# Patient Record
Sex: FEMALE | NOT HISPANIC OR LATINO | Employment: FULL TIME | URBAN - METROPOLITAN AREA
[De-identification: names, ages, dates, MRNs, and addresses within clinical notes are randomized per-mention and may not be internally consistent; named-entity substitution may affect disease eponyms.]

---

## 2021-11-30 ENCOUNTER — OFFICE VISIT (OUTPATIENT)
Dept: FAMILY MEDICINE CLINIC | Facility: CLINIC | Age: 26
End: 2021-11-30

## 2021-11-30 VITALS
WEIGHT: 107.4 LBS | HEART RATE: 66 BPM | TEMPERATURE: 98.2 F | BODY MASS INDEX: 19.77 KG/M2 | OXYGEN SATURATION: 100 % | HEIGHT: 62 IN | SYSTOLIC BLOOD PRESSURE: 102 MMHG | DIASTOLIC BLOOD PRESSURE: 68 MMHG

## 2021-11-30 DIAGNOSIS — J30.2 SEASONAL ALLERGIC RHINITIS, UNSPECIFIED TRIGGER: ICD-10-CM

## 2021-11-30 DIAGNOSIS — Z91.038 ALLERGY TO INSECT BITES: Primary | ICD-10-CM

## 2021-11-30 PROCEDURE — 99203 OFFICE O/P NEW LOW 30 MIN: CPT | Performed by: NURSE PRACTITIONER

## 2021-11-30 RX ORDER — CETIRIZINE HYDROCHLORIDE 10 MG/1
10 TABLET ORAL DAILY
Qty: 30 TABLET | Refills: 5 | Status: SHIPPED | OUTPATIENT
Start: 2021-11-30 | End: 2022-05-19

## 2021-11-30 NOTE — PROGRESS NOTES
"Chief Complaint  Establish Care and sinus trouble    Subjective          Nolvia Mckenna presents to [facility]  History of Present Illness  She presents today as a new patient to establish care.  She is accompanied by her mother.    She states that whenever she gets bitten by an insect like a mosquito or a fire ant that she has an allergic reaction where she swells.  She also complains of swelling with spider bites.  She denies having any breathing difficulty even when she has been stung by bees.  She also complains of sinus trouble seasonally.  She has not tried any over-the-counter meds for her sinus issues and only uses steam therapy.    She has never had a Pap smear but also states she has never been sexually active.  We discussed purpose of Pap smears and that she can start getting Pap smears after she becomes sexually active.    Objective   Vital Signs:   /68   Pulse 66   Temp 98.2 °F (36.8 °C)   Ht 157.5 cm (62\")   Wt 48.7 kg (107 lb 6.4 oz)   SpO2 100%   BMI 19.64 kg/m²     Physical Exam  Vitals reviewed.   Constitutional:       Appearance: Normal appearance. She is well-developed.   HENT:      Right Ear: Hearing, tympanic membrane, ear canal and external ear normal.      Left Ear: Hearing, tympanic membrane, ear canal and external ear normal.      Mouth/Throat:      Pharynx: No pharyngeal swelling, oropharyngeal exudate or posterior oropharyngeal erythema.      Comments: Postnasal drainage noted.  Neck:      Thyroid: No thyroid mass, thyromegaly or thyroid tenderness.   Cardiovascular:      Rate and Rhythm: Normal rate and regular rhythm.      Heart sounds: No murmur heard.  No friction rub. No gallop.    Pulmonary:      Effort: Pulmonary effort is normal.      Breath sounds: Normal breath sounds. No wheezing or rhonchi.   Lymphadenopathy:      Cervical: No cervical adenopathy.   Skin:     General: Skin is warm and dry.   Neurological:      Mental Status: She is " alert and oriented to person, place, and time.      Cranial Nerves: No cranial nerve deficit.   Psychiatric:         Mood and Affect: Mood and affect normal.         Behavior: Behavior normal.         Thought Content: Thought content normal. Thought content does not include homicidal or suicidal ideation.         Judgment: Judgment normal.        Result Review :                 Assessment and Plan    Diagnoses and all orders for this visit:    1. Allergy to insect bites (Primary)  Assessment & Plan:  I advised her to wear insect repellent when she is going to be outdoors.  I also advised to use sticky spider traps in her bedroom and not to leave her clothes on the floor.  We also discussed that if she takes Zyrtec during the summertime that it will decrease the allergic response to insect bites.      2. Seasonal allergic rhinitis, unspecified trigger  Assessment & Plan:  Recommend to take Zyrtec daily during seasonal allergies.      Other orders  -     cetirizine (zyrTEC) 10 MG tablet; Take 1 tablet by mouth Daily.  Dispense: 30 tablet; Refill: 5      Follow Up   Return if symptoms worsen or fail to improve.  Patient was given instructions and counseling regarding her condition or for health maintenance advice. Please see specific information pulled into the AVS if appropriate.

## 2021-11-30 NOTE — ASSESSMENT & PLAN NOTE
I advised her to wear insect repellent when she is going to be outdoors.  I also advised to use sticky spider traps in her bedroom and not to leave her clothes on the floor.  We also discussed that if she takes Zyrtec during the summertime that it will decrease the allergic response to insect bites.

## 2022-04-21 ENCOUNTER — TELEPHONE (OUTPATIENT)
Dept: FAMILY MEDICINE CLINIC | Facility: CLINIC | Age: 27
End: 2022-04-21

## 2022-05-19 ENCOUNTER — OFFICE VISIT (OUTPATIENT)
Dept: FAMILY MEDICINE CLINIC | Facility: CLINIC | Age: 27
End: 2022-05-19

## 2022-05-19 VITALS
BODY MASS INDEX: 19.69 KG/M2 | HEART RATE: 58 BPM | SYSTOLIC BLOOD PRESSURE: 112 MMHG | WEIGHT: 107 LBS | DIASTOLIC BLOOD PRESSURE: 62 MMHG | TEMPERATURE: 97.5 F | HEIGHT: 62 IN | OXYGEN SATURATION: 98 %

## 2022-05-19 DIAGNOSIS — R53.83 FATIGUE, UNSPECIFIED TYPE: ICD-10-CM

## 2022-05-19 DIAGNOSIS — R44.8 FEELS COLD: ICD-10-CM

## 2022-05-19 DIAGNOSIS — Z00.00 ANNUAL PHYSICAL EXAM: Primary | ICD-10-CM

## 2022-05-19 LAB
25(OH)D3 SERPL-MCNC: 21.9 NG/ML (ref 30–100)
ALBUMIN SERPL-MCNC: 4.8 G/DL (ref 3.5–5.2)
ALBUMIN/GLOB SERPL: 1.6 G/DL
ALP SERPL-CCNC: 35 U/L (ref 39–117)
ALT SERPL W P-5'-P-CCNC: 17 U/L (ref 1–33)
ANION GAP SERPL CALCULATED.3IONS-SCNC: 13.3 MMOL/L (ref 5–15)
AST SERPL-CCNC: 19 U/L (ref 1–32)
BASOPHILS # BLD MANUAL: 0.05 10*3/MM3 (ref 0–0.2)
BASOPHILS NFR BLD MANUAL: 1 % (ref 0–1.5)
BILIRUB SERPL-MCNC: 1.2 MG/DL (ref 0–1.2)
BUN SERPL-MCNC: 9 MG/DL (ref 6–20)
BUN/CREAT SERPL: 13.4 (ref 7–25)
CALCIUM SPEC-SCNC: 9.5 MG/DL (ref 8.6–10.5)
CHLORIDE SERPL-SCNC: 105 MMOL/L (ref 98–107)
CO2 SERPL-SCNC: 19.7 MMOL/L (ref 22–29)
CREAT SERPL-MCNC: 0.67 MG/DL (ref 0.57–1)
DEPRECATED RDW RBC AUTO: 39 FL (ref 37–54)
EGFRCR SERPLBLD CKD-EPI 2021: 125.3 ML/MIN/1.73
EOSINOPHIL # BLD MANUAL: 0.05 10*3/MM3 (ref 0–0.4)
EOSINOPHIL NFR BLD MANUAL: 1 % (ref 0.3–6.2)
ERYTHROCYTE [DISTWIDTH] IN BLOOD BY AUTOMATED COUNT: 11.8 % (ref 12.3–15.4)
FERRITIN SERPL-MCNC: 62.1 NG/ML (ref 13–150)
FOLATE SERPL-MCNC: 8.73 NG/ML (ref 4.78–24.2)
GLOBULIN UR ELPH-MCNC: 3 GM/DL
GLUCOSE SERPL-MCNC: 76 MG/DL (ref 65–99)
HCT VFR BLD AUTO: 42.7 % (ref 34–46.6)
HGB BLD-MCNC: 14 G/DL (ref 12–15.9)
IRON 24H UR-MRATE: 174 MCG/DL (ref 37–145)
IRON SATN MFR SERPL: 43 % (ref 20–50)
LYMPHOCYTES # BLD MANUAL: 1.93 10*3/MM3 (ref 0.7–3.1)
LYMPHOCYTES NFR BLD MANUAL: 3 % (ref 5–12)
MCH RBC QN AUTO: 29.5 PG (ref 26.6–33)
MCHC RBC AUTO-ENTMCNC: 32.8 G/DL (ref 31.5–35.7)
MCV RBC AUTO: 90.1 FL (ref 79–97)
MONOCYTES # BLD: 0.16 10*3/MM3 (ref 0.1–0.9)
NEUTROPHILS # BLD AUTO: 3.03 10*3/MM3 (ref 1.7–7)
NEUTROPHILS NFR BLD MANUAL: 58 % (ref 42.7–76)
PLAT MORPH BLD: NORMAL
PLATELET # BLD AUTO: 224 10*3/MM3 (ref 140–450)
PMV BLD AUTO: 11.2 FL (ref 6–12)
POTASSIUM SERPL-SCNC: 4.2 MMOL/L (ref 3.5–5.2)
PROT SERPL-MCNC: 7.8 G/DL (ref 6–8.5)
RBC # BLD AUTO: 4.74 10*6/MM3 (ref 3.77–5.28)
RBC MORPH BLD: NORMAL
SODIUM SERPL-SCNC: 138 MMOL/L (ref 136–145)
T-UPTAKE NFR SERPL: 1.15 TBI (ref 0.8–1.3)
T4 SERPL-MCNC: 8.67 MCG/DL (ref 4.5–11.7)
TIBC SERPL-MCNC: 402 MCG/DL (ref 298–536)
TRANSFERRIN SERPL-MCNC: 270 MG/DL (ref 200–360)
TSH SERPL DL<=0.05 MIU/L-ACNC: 1.95 UIU/ML (ref 0.27–4.2)
VARIANT LYMPHS NFR BLD MANUAL: 37 % (ref 19.6–45.3)
VIT B12 BLD-MCNC: 522 PG/ML (ref 211–946)
WBC MORPH BLD: NORMAL
WBC NRBC COR # BLD: 5.22 10*3/MM3 (ref 3.4–10.8)

## 2022-05-19 PROCEDURE — 84479 ASSAY OF THYROID (T3 OR T4): CPT | Performed by: NURSE PRACTITIONER

## 2022-05-19 PROCEDURE — 84436 ASSAY OF TOTAL THYROXINE: CPT | Performed by: NURSE PRACTITIONER

## 2022-05-19 PROCEDURE — 82746 ASSAY OF FOLIC ACID SERUM: CPT | Performed by: NURSE PRACTITIONER

## 2022-05-19 PROCEDURE — 82607 VITAMIN B-12: CPT | Performed by: NURSE PRACTITIONER

## 2022-05-19 PROCEDURE — 80050 GENERAL HEALTH PANEL: CPT | Performed by: NURSE PRACTITIONER

## 2022-05-19 PROCEDURE — 83540 ASSAY OF IRON: CPT | Performed by: NURSE PRACTITIONER

## 2022-05-19 PROCEDURE — 82306 VITAMIN D 25 HYDROXY: CPT | Performed by: NURSE PRACTITIONER

## 2022-05-19 PROCEDURE — 99395 PREV VISIT EST AGE 18-39: CPT | Performed by: NURSE PRACTITIONER

## 2022-05-19 PROCEDURE — 82728 ASSAY OF FERRITIN: CPT | Performed by: NURSE PRACTITIONER

## 2022-05-19 PROCEDURE — 85007 BL SMEAR W/DIFF WBC COUNT: CPT | Performed by: NURSE PRACTITIONER

## 2022-05-19 PROCEDURE — 84466 ASSAY OF TRANSFERRIN: CPT | Performed by: NURSE PRACTITIONER

## 2022-05-19 NOTE — PROGRESS NOTES
"Chief Complaint  Annual Exam    Subjective          Medical History: has a past medical history of Allergic and Sinus trouble.     Surgical History: has no past surgical history on file.     Family History: family history includes Diabetes in her maternal grandfather and paternal grandfather.     Social History: reports that she has never smoked. She has never used smokeless tobacco. She reports previous alcohol use.    Nolvia Benton presents to [facility]    ENCOUNTER DATE:  05/19/2022    Nolvia Benton / 26 y.o. / female      CHIEF COMPLAINT    Annual Exam      VITALS    /62   Pulse 58   Temp 97.5 °F (36.4 °C)   Ht 157.5 cm (62\")   Wt 48.5 kg (107 lb)   SpO2 98%   BMI 19.57 kg/m²     BP Readings from Last 3 Encounters:  05/19/22 : 112/62  11/30/21 : 102/68  09/09/21 : 109/84    Wt Readings from Last 3 Encounters:  05/19/22 : 48.5 kg (107 lb)  11/30/21 : 48.7 kg (107 lb 6.4 oz)  09/09/21 : 48.9 kg (107 lb 14.4 oz)    Body mass index is 19.57 kg/m².    MEDICATIONS    Current Outpatient Medications on File Prior to Visit:  (DISCONTINUED) cetirizine (zyrTEC) 10 MG tablet, Take 1 tablet by mouth Daily., Disp: 30 tablet, Rfl: 5    No current facility-administered medications on file prior to visit.        HISTORY OF PRESENT ILLNESS    Nolvia presents for annual health maintenance visit.    Last health maintenance visit: approximately 1 year ago  General health: excellent  Lifestyle:  Attempting to lose weight?: No   Diet: eats a well balanced, healthy diet  Exercise: walks regularly  Tobacco: Never used   Alcohol: does not drink  Work: Full-time  Reproductive health:  Sexually active?: No   Domestic abuse concerns: No   Depression Screening:   PHQ-2: 0 (Not depressed)  PHQ-9: 0 (Negative screening for depression)    Patient Care Team:  Wilda Belle APRN as PCP - General (Nurse Practitioner)      ALLERGIES  No Known Allergies     PFSH:     The following portions of the " patient's history were reviewed and updated as appropriate: Allergies / Current Medications / Past Medical History / Surgical History / Social History / Family History    PROBLEM LIST   Patient Active Problem List:    Seasonal allergic rhinitis    Allergy to insect bites      PAST MEDICAL HISTORY  Past Medical History:  No date: Allergic  No date: Sinus trouble    SURGICAL HISTORY  No past surgical history on file.    SOCIAL HISTORY  Social History   Socioeconomic History     Marital status: Single   Tobacco Use     Smoking status: Never Smoker     Smokeless tobacco: Never Used   Vaping Use     Vaping Use: Never used   Substance and Sexual Activity     Alcohol use: Not Currently      FAMILY HISTORY  Review of patient's family history indicates:  Problem: Diabetes     Relation: Maternal Grandfather         Age of Onset: (Not Specified)  Problem: Diabetes     Relation: Paternal Grandfather         Age of Onset: (Not Specified)      IMMUNIZATION HISTORY    Immunization History  Administered            Date(s) Administered   COVID-19 (PFIZER) PURPLE CAP                         05/23/2021 06/14/2021 01/08/2022     Fluzone Split Quad (Multi-dose)                         10/15/2021      Labs:    No results found for: NA, K, CALCIUM, AST, ALT, BUN, CREATININE, EGFRIFNONA, EGFRIFAFRI    No results found for: GLU, HGBA1C, TSH, FREET4    No results found for: LDL, HDL, TRIG, CHOLHDLRATIO    No results found for: OKLW25VE     No results found for: WBC, HGB, MCV, PLT    No results found for: PROTEIN, GLUCOSEU, BLOODU, NITRITEU, LEUKOCYTESUR    No results found for: HEPCVIRUSABY        ASSESSMENT & PLAN    ANNUAL WELLNESS EXAM / PHYSICAL     HEALTHCARE MAINTENANCE ISSUES    Cancer Screening:  Colon: Initial/Next screening at age: 47  Repeat colon cancer screening: N/A at this time  Breast: Recommended monthly self exams; annual professional exam  Mammogram: N/A at this time  Cervical: never had one, not sexual active  Skin:  "Monthly self skin examination, annual exam by health professional      Lifestyle Counseling:  Lifestyle Modifications: Continue good lifestyle choices/modifications  Safety Issues: Always wear seatbelt, Avoid texting while driving   Use sunscreen, regular skin examination  Recommended annual dental/vision examination.  Emotional/Stress/Sleep: Reviewed and  given when appropriate  Fatigue  This is a recurrent problem. The current episode started more than 1 month ago. The problem occurs intermittently. The problem has been waxing and waning. Associated symptoms include fatigue. Pertinent negatives include no arthralgias, congestion, coughing, joint swelling, nausea, swollen glands or vomiting. Associated symptoms comments: Feels cold easily. The symptoms are aggravated by stress. She has tried rest for the symptoms. The treatment provided mild relief.       Objective   Vital Signs:   /62   Pulse 58   Temp 97.5 °F (36.4 °C)   Ht 157.5 cm (62\")   Wt 48.5 kg (107 lb)   SpO2 98%   BMI 19.57 kg/m²     Physical Exam  Vitals reviewed.   Constitutional:       Appearance: Normal appearance. She is well-developed.   HENT:      Head: Normocephalic and atraumatic.      Right Ear: Tympanic membrane and external ear normal.      Left Ear: Tympanic membrane and external ear normal.   Eyes:      Conjunctiva/sclera: Conjunctivae normal.      Pupils: Pupils are equal, round, and reactive to light.   Cardiovascular:      Rate and Rhythm: Normal rate and regular rhythm.      Heart sounds: No murmur heard.  Pulmonary:      Effort: Pulmonary effort is normal.      Breath sounds: Normal breath sounds. No wheezing or rhonchi.   Abdominal:      Palpations: Abdomen is soft.      Tenderness: There is no abdominal tenderness.   Skin:     General: Skin is warm and dry.   Neurological:      Mental Status: She is alert and oriented to person, place, and time.   Psychiatric:         Mood and Affect: Mood and affect normal.        "  Behavior: Behavior normal.         Thought Content: Thought content normal.         Judgment: Judgment normal.        Result Review :   The following data was reviewed by: MARCELA Ortiz on 05/19/2022:                  Current Outpatient Medications on File Prior to Visit   Medication Sig Dispense Refill   • [DISCONTINUED] cetirizine (zyrTEC) 10 MG tablet Take 1 tablet by mouth Daily. 30 tablet 5     No current facility-administered medications on file prior to visit.        Assessment and Plan    Diagnoses and all orders for this visit:    1. Annual physical exam (Primary)  -     CBC With Manual Differential  -     Comprehensive Metabolic Panel  -     Ferritin    2. Feels cold  Comments:  Patient states she feels cold frequently.  We will check baseline labs including iron and thyroid panel.  Patient is agreeable treatment plan.  Orders:  -     Ferritin  -     Thyroid Panel With TSH  -     Iron Profile    3. Fatigue, unspecified type  Comments:  We will check iron, vitamin D, B12, thyroid, and basic labs to rule out any acute underlying issues.  Orders:  -     CBC With Manual Differential  -     Comprehensive Metabolic Panel  -     Ferritin  -     Thyroid Panel With TSH  -     Vitamin B12 & Folate  -     Vitamin D 25 Hydroxy  -     Iron Profile        Follow Up   Return in about 1 year (around 5/19/2023) for Annual physical.  Patient was given instructions and counseling regarding her condition or for health maintenance advice. Please see specific information pulled into the AVS if appropriate.

## 2022-05-20 ENCOUNTER — PATIENT ROUNDING (BHMG ONLY) (OUTPATIENT)
Dept: FAMILY MEDICINE CLINIC | Facility: CLINIC | Age: 27
End: 2022-05-20

## 2022-05-20 ENCOUNTER — TELEPHONE (OUTPATIENT)
Dept: FAMILY MEDICINE CLINIC | Facility: CLINIC | Age: 27
End: 2022-05-20

## 2022-05-20 RX ORDER — ERGOCALCIFEROL 1.25 MG/1
50000 CAPSULE ORAL WEEKLY
Qty: 13 CAPSULE | Refills: 1 | Status: SHIPPED | OUTPATIENT
Start: 2022-05-20 | End: 2022-08-18

## 2022-05-20 NOTE — TELEPHONE ENCOUNTER
Hub staff attempted to follow warm transfer process and was unsuccessful     Caller: Nolvia Benton    Relationship to patient: Self    Best call back number: [phone]     Patient is needing: PATIENT HAS SOME MORE QUESTIONS ABOUT RECENT LAB RESULTS AND WOULD LIKE A CALL BACK TO DISCUSS

## 2022-05-20 NOTE — PROGRESS NOTES
May 20, 2022    Hello, may I speak with Nolvia Benton?    My name is Prosper Olivares      I am  with [facility]  [address]  [address]  [place] [address]  [phone].    Before we get started may I verify your date of birth? 1995    I am calling to officially welcome you to our practice and ask about your recent visit. Is this a good time to talk? yes    Tell me about your visit with us. What things went well?  The visit went well        We're always looking for ways to make our patients' experiences even better. Do you have recommendations on ways we may improve?  no    Overall were you satisfied with your first visit to our practice? yes       I appreciate you taking the time to speak with me today. Is there anything else I can do for you? no      Thank you, and have a great day.

## 2022-05-20 NOTE — TELEPHONE ENCOUNTER
Spoke with patient, she wanted to make sure her iron level isn't something to be concerned about. I explained it was only slightly elevated and her ferritin level was normal. Stated we will recheck her labs in 6 months.

## 2022-10-21 ENCOUNTER — CLINICAL SUPPORT (OUTPATIENT)
Dept: FAMILY MEDICINE CLINIC | Facility: CLINIC | Age: 27
End: 2022-10-21

## 2022-10-21 DIAGNOSIS — Z23 NEED FOR INFLUENZA VACCINATION: Primary | ICD-10-CM

## 2022-10-21 PROCEDURE — 90471 IMMUNIZATION ADMIN: CPT | Performed by: NURSE PRACTITIONER

## 2022-10-21 PROCEDURE — 90686 IIV4 VACC NO PRSV 0.5 ML IM: CPT | Performed by: NURSE PRACTITIONER

## 2023-06-05 ENCOUNTER — OFFICE VISIT (OUTPATIENT)
Dept: FAMILY MEDICINE CLINIC | Facility: CLINIC | Age: 28
End: 2023-06-05
Payer: COMMERCIAL

## 2023-06-05 VITALS
BODY MASS INDEX: 18.58 KG/M2 | OXYGEN SATURATION: 100 % | SYSTOLIC BLOOD PRESSURE: 94 MMHG | TEMPERATURE: 98.9 F | HEART RATE: 74 BPM | DIASTOLIC BLOOD PRESSURE: 52 MMHG | WEIGHT: 101 LBS | HEIGHT: 62 IN

## 2023-06-05 DIAGNOSIS — Z13.29 THYROID DISORDER SCREENING: ICD-10-CM

## 2023-06-05 DIAGNOSIS — G47.00 INSOMNIA, UNSPECIFIED TYPE: ICD-10-CM

## 2023-06-05 DIAGNOSIS — Z83.2 FAMILY HISTORY OF IRON DEFICIENCY ANEMIA: ICD-10-CM

## 2023-06-05 DIAGNOSIS — E53.8 VITAMIN B12 DEFICIENCY: ICD-10-CM

## 2023-06-05 DIAGNOSIS — Z00.00 ANNUAL PHYSICAL EXAM: Primary | ICD-10-CM

## 2023-06-05 DIAGNOSIS — Z13.220 LIPID SCREENING: ICD-10-CM

## 2023-06-05 DIAGNOSIS — E55.9 VITAMIN D DEFICIENCY: ICD-10-CM

## 2023-06-05 DIAGNOSIS — Z91.89 DRIVING SAFETY ISSUE: ICD-10-CM

## 2023-06-05 DIAGNOSIS — Z11.59 ENCOUNTER FOR HEPATITIS C SCREENING TEST FOR LOW RISK PATIENT: ICD-10-CM

## 2023-06-05 RX ORDER — LANOLIN ALCOHOL/MO/W.PET/CERES
3 CREAM (GRAM) TOPICAL NIGHTLY PRN
Qty: 90 TABLET | Refills: 1 | Status: SHIPPED | OUTPATIENT
Start: 2023-06-05

## 2023-06-05 NOTE — PROGRESS NOTES
"  ENCOUNTER DATE:  06/05/2023    Nolvia Benton / 27 y.o. / female      CHIEF COMPLAINT     Annual Exam      VITALS     Visit Vitals  BP 94/52   Pulse 74   Temp 98.9 °F (37.2 °C)   Ht 157.5 cm (62\")   Wt 45.8 kg (101 lb)   SpO2 100%   BMI 18.47 kg/m²       BP Readings from Last 3 Encounters:   06/05/23 94/52   03/18/23 96/53   05/19/22 112/62     Wt Readings from Last 3 Encounters:   06/05/23 45.8 kg (101 lb)   03/18/23 46.1 kg (101 lb 9.6 oz)   05/19/22 48.5 kg (107 lb)      Body mass index is 18.47 kg/m².    MEDICATIONS     Current Outpatient Medications on File Prior to Visit   Medication Sig Dispense Refill    [DISCONTINUED] acetaminophen (TYLENOL) 500 MG tablet Take 1 tablet by mouth Every 6 (Six) Hours As Needed for Mild Pain.       No current facility-administered medications on file prior to visit.         HISTORY OF PRESENT ILLNESS     Nolvia presents for annual health maintenance visit.  No results found for: HPVAPTIMA   Last health maintenance visit: approximately 1 year ago  General health: good  Lifestyle:  Attempting to lose weight?: No   Diet: eats a well balanced, healthy diet  Exercise: walks regularly  Tobacco: Never used   Alcohol: does not drink  Work: Full-time  Reproductive health:  Sexually active?: No   Sexual problems?: No problems  Concern for STD?: No    Sees Gynecologist?: Yes   Mague/Postmenopausal?: No   Domestic abuse concerns: No   Depression Screening:          PHQ-9 Depression Screening  Little interest or pleasure in doing things? 1-->several days   Feeling down, depressed, or hopeless? 0-->not at all   Trouble falling or staying asleep, or sleeping too much?     Feeling tired or having little energy?     Poor appetite or overeating?     Feeling bad about yourself - or that you are a failure or have let yourself or your family down?     Trouble concentrating on things, such as reading the newspaper or watching television?     Moving or speaking so slowly that other people could have " noticed? Or the opposite - being so fidgety or restless that you have been moving around a lot more than usual?     Thoughts that you would be better off dead, or of hurting yourself in some way?     PHQ-9 Total Score 1   If you checked off any problems, how difficult have these problems made it for you to do your work, take care of things at home, or get along with other people?           DENISE-7           Patient Care Team:  Karrie Coffey APRN as PCP - General (Nurse Practitioner)      ALLERGIES  No Known Allergies     PFSH:     The following portions of the patient's history were reviewed and updated as appropriate: Allergies / Current Medications / Past Medical History / Surgical History / Social History / Family History    PROBLEM LIST   Patient Active Problem List   Diagnosis    Seasonal allergic rhinitis    Allergy to insect bites       PAST MEDICAL HISTORY  Past Medical History:   Diagnosis Date    Allergic     Sinus trouble        SURGICAL HISTORY  History reviewed. No pertinent surgical history.    SOCIAL HISTORY  Social History     Socioeconomic History    Marital status: Single   Tobacco Use    Smoking status: Never    Smokeless tobacco: Never   Vaping Use    Vaping Use: Never used   Substance and Sexual Activity    Alcohol use: Not Currently    Drug use: Never    Sexual activity: Defer       FAMILY HISTORY  Family History   Problem Relation Age of Onset    Diabetes Maternal Grandfather     Diabetes Paternal Grandfather        IMMUNIZATION HISTORY  Immunization History   Administered Date(s) Administered    COVID-19 (PFIZER) Purple Cap Monovalent 05/23/2021, 06/14/2021    FluLaval/Fluzone >6mos 10/21/2022    Fluzone Quad >6mos (Multi-dose) 10/15/2021       HPI  HPI    Patient is a 27-year-old female who comes in for annual physical exam.  Overall she states she is feeling well.  BMI is 18.47, she states she eats a well-balanced diet and tries to eat protein.  Blood pressure stable at 94/52.  She  denies any recent illness, or feeling fatigue.  Patient states she is having trouble falling asleep.  She has done some research and would like to try melatonin as a natural herbal therapy to help her fall asleep.  Patient admits that its more of a restless mind that is keeping her from falling asleep.    Patient states she is having anxiety while driving.  She denies ever being in a car accident.  She states she has had her license since she was 21.  She states driving is worse in city or when traffic is very heavy.  Patient states that she gets overwhelmed, and has trouble completing what she needs to do.  Patient states she does try with her family which helps because they do help guide her while driving.    Patient has a family history of iron deficiency anemia she would like her ferritin and iron levels checked, she has a current history of vitamin D and B12 deficiency patient would like those rechecked as well.  Overall she states that she is feeling well.  Physical Exam  Vitals reviewed.   Constitutional:       Appearance: Normal appearance. She is well-developed.   HENT:      Head: Normocephalic and atraumatic.   Eyes:      Conjunctiva/sclera: Conjunctivae normal.      Pupils: Pupils are equal, round, and reactive to light.   Cardiovascular:      Rate and Rhythm: Normal rate and regular rhythm.      Heart sounds: No murmur heard.  Pulmonary:      Effort: Pulmonary effort is normal.      Breath sounds: Normal breath sounds. No wheezing or rhonchi.   Skin:     General: Skin is warm and dry.   Neurological:      Mental Status: She is alert and oriented to person, place, and time.   Psychiatric:         Mood and Affect: Mood and affect normal.         Behavior: Behavior normal.         Thought Content: Thought content normal.         Judgment: Judgment normal.       REVIEWED DATA      Labs:    Lab Results   Component Value Date     05/19/2022    K 4.2 05/19/2022    CALCIUM 9.5 05/19/2022    AST 19  05/19/2022    ALT 17 05/19/2022    BUN 9 05/19/2022    CREATININE 0.67 05/19/2022       Lab Results   Component Value Date    TSH 1.950 05/19/2022       No results found for: LDL, HDL, TRIG, CHOLHDLRATIO    Lab Results   Component Value Date    CBIZ41VQ 21.9 (L) 05/19/2022        Lab Results   Component Value Date    WBC 5.22 05/19/2022    HGB 14.0 05/19/2022    MCV 90.1 05/19/2022     05/19/2022       No results found for: PROTEIN, GLUCOSEU, BLOODU, NITRITEU, LEUKOCYTESUR     No results found for: HEPCVIRUSABY        ASSESSMENT & PLAN     Diagnoses and all orders for this visit:    1. Annual physical exam (Primary)  -     CBC & Differential; Future  -     Comprehensive Metabolic Panel; Future  -     Urinalysis With Microscopic - Urine, Clean Catch    2. Vitamin D deficiency  Comments:  Recheck labs, adjust medication if needed  Orders:  -     Vitamin D 25 hydroxy; Future    3. Vitamin B12 deficiency  Comments:  We will recheck labs, adjust medication if needed  Orders:  -     Vitamin B12; Future  -     Folate; Future    4. Encounter for hepatitis C screening test for low risk patient  -     Hepatitis C antibody; Future    5. Lipid screening  Comments:  We will do yearly lipid screening patient is agreeable  Orders:  -     Lipid Panel; Future    6. Thyroid disorder screening  Comments:  We will do a yearly thyroid screening, patient is agreeable  Orders:  -     TSH; Future    7. Insomnia, unspecified type  Comments:  We will send over melatonin, discussed to take before bedtime patient verbalized understanding    8. Family history of iron deficiency anemia  Comments:  We will check iron and ferritin to rule out any iron deficiency anemia  Orders:  -     Iron Profile; Future  -     Ferritin; Future    9. Anxiety when driving a vehicle  Comments:  Encouraged to enroll in driving classes, meditation, deep breathing while driving patient verbalized understanding    Other orders  -     melatonin 3 MG tablet; Take  1 tablet by mouth At Night As Needed for Sleep.  Dispense: 90 tablet; Refill: 1        HEALTHCARE MAINTENANCE ISSUES     Cancer Screening:  Colon: Initial/Next screening at age: 47  Repeat colon cancer screening: N/A at this time  Breast: Recommended monthly self exams; annual professional exam  Mammogram: N/A at this time  Cervical: Never  Skin: Monthly self skin examination, annual exam by health professional      Lifestyle Counseling:  Lifestyle Modifications: Continue good lifestyle choices/modifications and Improve dietary compliance  Safety Issues: Always wear seatbelt, Avoid texting while driving   Use sunscreen, regular skin examination  Recommended annual dental/vision examination.  Emotional/Stress/Sleep: Reviewed and  given when appropriate    Part of this note may be electronic transcription/translation of spoken language to printed text using the Dragon dictation system

## 2023-06-08 ENCOUNTER — CLINICAL SUPPORT (OUTPATIENT)
Dept: FAMILY MEDICINE CLINIC | Facility: CLINIC | Age: 28
End: 2023-06-08
Payer: COMMERCIAL

## 2023-06-08 DIAGNOSIS — E55.9 VITAMIN D DEFICIENCY: ICD-10-CM

## 2023-06-08 DIAGNOSIS — E53.8 VITAMIN B12 DEFICIENCY: ICD-10-CM

## 2023-06-08 DIAGNOSIS — Z11.59 ENCOUNTER FOR HEPATITIS C SCREENING TEST FOR LOW RISK PATIENT: ICD-10-CM

## 2023-06-08 DIAGNOSIS — Z13.29 THYROID DISORDER SCREENING: ICD-10-CM

## 2023-06-08 DIAGNOSIS — Z13.220 LIPID SCREENING: ICD-10-CM

## 2023-06-08 DIAGNOSIS — Z83.2 FAMILY HISTORY OF IRON DEFICIENCY ANEMIA: ICD-10-CM

## 2023-06-08 DIAGNOSIS — Z00.00 ANNUAL PHYSICAL EXAM: ICD-10-CM

## 2023-06-08 LAB
25(OH)D3 SERPL-MCNC: 18.8 NG/ML (ref 30–100)
ALBUMIN SERPL-MCNC: 4.2 G/DL (ref 3.5–5.2)
ALBUMIN/GLOB SERPL: 1.3 G/DL
ALP SERPL-CCNC: 28 U/L (ref 39–117)
ALT SERPL W P-5'-P-CCNC: 12 U/L (ref 1–33)
ANION GAP SERPL CALCULATED.3IONS-SCNC: 11.4 MMOL/L (ref 5–15)
AST SERPL-CCNC: 21 U/L (ref 1–32)
BACTERIA UR QL AUTO: NORMAL /HPF
BILIRUB SERPL-MCNC: 1 MG/DL (ref 0–1.2)
BILIRUB UR QL STRIP: NEGATIVE
BUN SERPL-MCNC: 9 MG/DL (ref 6–20)
BUN/CREAT SERPL: 13.6 (ref 7–25)
CALCIUM SPEC-SCNC: 9.5 MG/DL (ref 8.6–10.5)
CHLORIDE SERPL-SCNC: 103 MMOL/L (ref 98–107)
CHOLEST SERPL-MCNC: 166 MG/DL (ref 0–200)
CLARITY UR: CLEAR
CO2 SERPL-SCNC: 19.6 MMOL/L (ref 22–29)
COLOR UR: YELLOW
CREAT SERPL-MCNC: 0.66 MG/DL (ref 0.57–1)
EGFRCR SERPLBLD CKD-EPI 2021: 125 ML/MIN/1.73
FERRITIN SERPL-MCNC: 41.5 NG/ML (ref 13–150)
FOLATE SERPL-MCNC: 5.98 NG/ML (ref 4.78–24.2)
GLOBULIN UR ELPH-MCNC: 3.2 GM/DL
GLUCOSE SERPL-MCNC: 88 MG/DL (ref 65–99)
GLUCOSE UR STRIP-MCNC: NEGATIVE MG/DL
HCV AB SER DONR QL: NORMAL
HDLC SERPL-MCNC: 70 MG/DL (ref 40–60)
HGB UR QL STRIP.AUTO: NEGATIVE
HYALINE CASTS UR QL AUTO: NORMAL /LPF
IRON 24H UR-MRATE: 177 MCG/DL (ref 37–145)
IRON SATN MFR SERPL: 55 % (ref 20–50)
KETONES UR QL STRIP: NEGATIVE
LDLC SERPL CALC-MCNC: 86 MG/DL (ref 0–100)
LDLC/HDLC SERPL: 1.23 {RATIO}
LEUKOCYTE ESTERASE UR QL STRIP.AUTO: NEGATIVE
NITRITE UR QL STRIP: NEGATIVE
PH UR STRIP.AUTO: 6.5 [PH] (ref 5–8)
POTASSIUM SERPL-SCNC: 4.5 MMOL/L (ref 3.5–5.2)
PROT SERPL-MCNC: 7.4 G/DL (ref 6–8.5)
PROT UR QL STRIP: NEGATIVE
RBC # UR STRIP: NORMAL /HPF
REF LAB TEST METHOD: NORMAL
SODIUM SERPL-SCNC: 134 MMOL/L (ref 136–145)
SP GR UR STRIP: 1.01 (ref 1–1.03)
SQUAMOUS #/AREA URNS HPF: NORMAL /HPF
TIBC SERPL-MCNC: 322 MCG/DL (ref 298–536)
TRANSFERRIN SERPL-MCNC: 216 MG/DL (ref 200–360)
TRIGL SERPL-MCNC: 48 MG/DL (ref 0–150)
TSH SERPL DL<=0.05 MIU/L-ACNC: 2.52 UIU/ML (ref 0.27–4.2)
UROBILINOGEN UR QL STRIP: NORMAL
VIT B12 BLD-MCNC: 472 PG/ML (ref 211–946)
VLDLC SERPL-MCNC: 10 MG/DL (ref 5–40)
WBC # UR STRIP: NORMAL /HPF

## 2023-06-08 PROCEDURE — 81001 URINALYSIS AUTO W/SCOPE: CPT | Performed by: NURSE PRACTITIONER

## 2023-06-08 PROCEDURE — 82728 ASSAY OF FERRITIN: CPT | Performed by: NURSE PRACTITIONER

## 2023-06-08 PROCEDURE — 80061 LIPID PANEL: CPT | Performed by: NURSE PRACTITIONER

## 2023-06-08 PROCEDURE — 86803 HEPATITIS C AB TEST: CPT | Performed by: NURSE PRACTITIONER

## 2023-06-08 PROCEDURE — 82746 ASSAY OF FOLIC ACID SERUM: CPT | Performed by: NURSE PRACTITIONER

## 2023-06-08 PROCEDURE — 80053 COMPREHEN METABOLIC PANEL: CPT | Performed by: NURSE PRACTITIONER

## 2023-06-08 PROCEDURE — 84443 ASSAY THYROID STIM HORMONE: CPT | Performed by: NURSE PRACTITIONER

## 2023-06-08 PROCEDURE — 82306 VITAMIN D 25 HYDROXY: CPT | Performed by: NURSE PRACTITIONER

## 2023-06-08 PROCEDURE — 83540 ASSAY OF IRON: CPT | Performed by: NURSE PRACTITIONER

## 2023-06-08 PROCEDURE — 84466 ASSAY OF TRANSFERRIN: CPT | Performed by: NURSE PRACTITIONER

## 2023-06-08 PROCEDURE — 82607 VITAMIN B-12: CPT | Performed by: NURSE PRACTITIONER

## 2023-06-09 ENCOUNTER — CLINICAL SUPPORT (OUTPATIENT)
Dept: FAMILY MEDICINE CLINIC | Facility: CLINIC | Age: 28
End: 2023-06-09
Payer: COMMERCIAL

## 2023-06-09 DIAGNOSIS — Z83.2 FAMILY HISTORY OF ANEMIA: Primary | ICD-10-CM

## 2023-06-09 LAB
BASOPHILS # BLD AUTO: 0.03 10*3/MM3 (ref 0–0.2)
BASOPHILS NFR BLD AUTO: 0.6 % (ref 0–1.5)
DEPRECATED RDW RBC AUTO: 39.8 FL (ref 37–54)
EOSINOPHIL # BLD AUTO: 0.12 10*3/MM3 (ref 0–0.4)
EOSINOPHIL NFR BLD AUTO: 2.3 % (ref 0.3–6.2)
ERYTHROCYTE [DISTWIDTH] IN BLOOD BY AUTOMATED COUNT: 12.2 % (ref 12.3–15.4)
HCT VFR BLD AUTO: 38.1 % (ref 34–46.6)
HGB BLD-MCNC: 12.6 G/DL (ref 12–15.9)
IMM GRANULOCYTES # BLD AUTO: 0.01 10*3/MM3 (ref 0–0.05)
IMM GRANULOCYTES NFR BLD AUTO: 0.2 % (ref 0–0.5)
LYMPHOCYTES # BLD AUTO: 1.96 10*3/MM3 (ref 0.7–3.1)
LYMPHOCYTES NFR BLD AUTO: 37.5 % (ref 19.6–45.3)
MCH RBC QN AUTO: 29.9 PG (ref 26.6–33)
MCHC RBC AUTO-ENTMCNC: 33.1 G/DL (ref 31.5–35.7)
MCV RBC AUTO: 90.3 FL (ref 79–97)
MONOCYTES # BLD AUTO: 0.39 10*3/MM3 (ref 0.1–0.9)
MONOCYTES NFR BLD AUTO: 7.5 % (ref 5–12)
NEUTROPHILS NFR BLD AUTO: 2.72 10*3/MM3 (ref 1.7–7)
NEUTROPHILS NFR BLD AUTO: 51.9 % (ref 42.7–76)
NRBC BLD AUTO-RTO: 0 /100 WBC (ref 0–0.2)
PLATELET # BLD AUTO: 223 10*3/MM3 (ref 140–450)
PMV BLD AUTO: 11.2 FL (ref 6–12)
RBC # BLD AUTO: 4.22 10*6/MM3 (ref 3.77–5.28)
WBC NRBC COR # BLD: 5.23 10*3/MM3 (ref 3.4–10.8)

## 2023-06-09 PROCEDURE — 85025 COMPLETE CBC W/AUTO DIFF WBC: CPT | Performed by: NURSE PRACTITIONER

## 2023-06-09 RX ORDER — ERGOCALCIFEROL 1.25 MG/1
50000 CAPSULE ORAL WEEKLY
Qty: 13 CAPSULE | Refills: 1 | Status: SHIPPED | OUTPATIENT
Start: 2023-06-09 | End: 2023-12-08

## 2023-06-09 NOTE — PROGRESS NOTES
Called and spoke to Nolvia Benton to relay results and new Rx update. Pt verbalized understanding. No further questions/concerns at this time.

## 2023-12-13 ENCOUNTER — TELEPHONE (OUTPATIENT)
Dept: FAMILY MEDICINE CLINIC | Facility: CLINIC | Age: 28
End: 2023-12-13
Payer: COMMERCIAL

## 2023-12-13 NOTE — TELEPHONE ENCOUNTER
Caller: Nolvia Benton    Relationship: Self    Best call back number: [phone]    What orders are you requesting (i.e. lab or imaging): LABS     In what timeframe would the patient need to come in: ASAP     Where will you receive your lab/imaging services:      Additional notes:        THE PATIENT IS WANTING HER VITAMIN D CHECK

## 2023-12-14 DIAGNOSIS — D50.9 IRON DEFICIENCY ANEMIA, UNSPECIFIED IRON DEFICIENCY ANEMIA TYPE: ICD-10-CM

## 2023-12-14 DIAGNOSIS — E53.8 VITAMIN B12 DEFICIENCY: ICD-10-CM

## 2023-12-14 DIAGNOSIS — Z09 FOLLOW-UP EXAM: ICD-10-CM

## 2023-12-14 DIAGNOSIS — E55.9 VITAMIN D DEFICIENCY: Primary | ICD-10-CM

## 2023-12-14 NOTE — TELEPHONE ENCOUNTER
"Called Nolvia Benton at [phone] (M)  to notify and inform of future lab placement, however was unable to reach patient. Unable to LVM asking patient to please return call, patient has a \"VM box that has not been set up yet.\"    "

## 2024-02-12 ENCOUNTER — OFFICE VISIT (OUTPATIENT)
Dept: FAMILY MEDICINE CLINIC | Facility: CLINIC | Age: 29
End: 2024-02-12
Payer: COMMERCIAL

## 2024-02-12 VITALS
TEMPERATURE: 98.5 F | HEIGHT: 62 IN | HEART RATE: 64 BPM | SYSTOLIC BLOOD PRESSURE: 100 MMHG | OXYGEN SATURATION: 100 % | WEIGHT: 104.6 LBS | DIASTOLIC BLOOD PRESSURE: 66 MMHG | BODY MASS INDEX: 19.25 KG/M2

## 2024-02-12 DIAGNOSIS — E53.8 VITAMIN B12 DEFICIENCY: ICD-10-CM

## 2024-02-12 DIAGNOSIS — E55.9 VITAMIN D DEFICIENCY: ICD-10-CM

## 2024-02-12 DIAGNOSIS — R61 UNEXPLAINED NIGHT SWEATS: ICD-10-CM

## 2024-02-12 DIAGNOSIS — D50.9 IRON DEFICIENCY ANEMIA, UNSPECIFIED IRON DEFICIENCY ANEMIA TYPE: ICD-10-CM

## 2024-02-12 DIAGNOSIS — Z13.29 SCREENING FOR THYROID DISORDER: Primary | ICD-10-CM

## 2024-02-12 LAB
25(OH)D3 SERPL-MCNC: 39.4 NG/ML (ref 30–100)
ALBUMIN SERPL-MCNC: 4.8 G/DL (ref 3.5–5.2)
ALBUMIN/GLOB SERPL: 1.8 G/DL
ALP SERPL-CCNC: 34 U/L (ref 39–117)
ALT SERPL W P-5'-P-CCNC: 9 U/L (ref 1–33)
ANION GAP SERPL CALCULATED.3IONS-SCNC: 10 MMOL/L (ref 5–15)
AST SERPL-CCNC: 17 U/L (ref 1–32)
BILIRUB SERPL-MCNC: 0.3 MG/DL (ref 0–1.2)
BUN SERPL-MCNC: 8 MG/DL (ref 6–20)
BUN/CREAT SERPL: 12.3 (ref 7–25)
CALCIUM SPEC-SCNC: 9.4 MG/DL (ref 8.6–10.5)
CHLORIDE SERPL-SCNC: 103 MMOL/L (ref 98–107)
CO2 SERPL-SCNC: 23 MMOL/L (ref 22–29)
CREAT SERPL-MCNC: 0.65 MG/DL (ref 0.57–1)
DEPRECATED RDW RBC AUTO: 39 FL (ref 37–54)
EGFRCR SERPLBLD CKD-EPI 2021: 124.7 ML/MIN/1.73
EOSINOPHIL # BLD MANUAL: 0.07 10*3/MM3 (ref 0–0.4)
EOSINOPHIL NFR BLD MANUAL: 1.1 % (ref 0.3–6.2)
ERYTHROCYTE [DISTWIDTH] IN BLOOD BY AUTOMATED COUNT: 12 % (ref 12.3–15.4)
FERRITIN SERPL-MCNC: 43.7 NG/ML (ref 13–150)
FOLATE SERPL-MCNC: 5.43 NG/ML (ref 4.78–24.2)
GLOBULIN UR ELPH-MCNC: 2.7 GM/DL
GLUCOSE SERPL-MCNC: 79 MG/DL (ref 65–99)
HCT VFR BLD AUTO: 40.5 % (ref 34–46.6)
HGB BLD-MCNC: 13.4 G/DL (ref 12–15.9)
IRON 24H UR-MRATE: 59 MCG/DL (ref 37–145)
IRON SATN MFR SERPL: 17 % (ref 20–50)
LYMPHOCYTES # BLD MANUAL: 2.73 10*3/MM3 (ref 0.7–3.1)
LYMPHOCYTES NFR BLD MANUAL: 6.3 % (ref 5–12)
MCH RBC QN AUTO: 29.7 PG (ref 26.6–33)
MCHC RBC AUTO-ENTMCNC: 33.1 G/DL (ref 31.5–35.7)
MCV RBC AUTO: 89.8 FL (ref 79–97)
MONOCYTES # BLD: 0.4 10*3/MM3 (ref 0.1–0.9)
NEUTROPHILS # BLD AUTO: 3.13 10*3/MM3 (ref 1.7–7)
NEUTROPHILS NFR BLD MANUAL: 49.5 % (ref 42.7–76)
PLAT MORPH BLD: NORMAL
PLATELET # BLD AUTO: 263 10*3/MM3 (ref 140–450)
PMV BLD AUTO: 10.5 FL (ref 6–12)
POIKILOCYTOSIS BLD QL SMEAR: NORMAL
POTASSIUM SERPL-SCNC: 4.4 MMOL/L (ref 3.5–5.2)
PROT SERPL-MCNC: 7.5 G/DL (ref 6–8.5)
RBC # BLD AUTO: 4.51 10*6/MM3 (ref 3.77–5.28)
SODIUM SERPL-SCNC: 136 MMOL/L (ref 136–145)
TIBC SERPL-MCNC: 344 MCG/DL (ref 298–536)
TRANSFERRIN SERPL-MCNC: 231 MG/DL (ref 200–360)
TSH SERPL DL<=0.05 MIU/L-ACNC: 1.97 UIU/ML (ref 0.27–4.2)
VARIANT LYMPHS NFR BLD MANUAL: 2.1 % (ref 0–5)
VARIANT LYMPHS NFR BLD MANUAL: 41.1 % (ref 19.6–45.3)
VIT B12 BLD-MCNC: 517 PG/ML (ref 211–946)
WBC MORPH BLD: NORMAL
WBC NRBC COR # BLD AUTO: 6.33 10*3/MM3 (ref 3.4–10.8)

## 2024-02-12 PROCEDURE — 80053 COMPREHEN METABOLIC PANEL: CPT | Performed by: NURSE PRACTITIONER

## 2024-02-12 PROCEDURE — 82607 VITAMIN B-12: CPT | Performed by: NURSE PRACTITIONER

## 2024-02-12 PROCEDURE — 83540 ASSAY OF IRON: CPT | Performed by: NURSE PRACTITIONER

## 2024-02-12 PROCEDURE — 82746 ASSAY OF FOLIC ACID SERUM: CPT | Performed by: NURSE PRACTITIONER

## 2024-02-12 PROCEDURE — 82306 VITAMIN D 25 HYDROXY: CPT | Performed by: NURSE PRACTITIONER

## 2024-02-12 PROCEDURE — 85007 BL SMEAR W/DIFF WBC COUNT: CPT | Performed by: NURSE PRACTITIONER

## 2024-02-12 PROCEDURE — 85027 COMPLETE CBC AUTOMATED: CPT | Performed by: NURSE PRACTITIONER

## 2024-02-12 PROCEDURE — 82728 ASSAY OF FERRITIN: CPT | Performed by: NURSE PRACTITIONER

## 2024-02-12 PROCEDURE — 84443 ASSAY THYROID STIM HORMONE: CPT | Performed by: NURSE PRACTITIONER

## 2024-02-12 PROCEDURE — 84466 ASSAY OF TRANSFERRIN: CPT | Performed by: NURSE PRACTITIONER

## 2024-02-12 RX ORDER — ERGOCALCIFEROL 1.25 MG/1
50000 CAPSULE ORAL WEEKLY
COMMUNITY

## 2024-09-04 ENCOUNTER — TELEPHONE (OUTPATIENT)
Dept: FAMILY MEDICINE CLINIC | Facility: CLINIC | Age: 29
End: 2024-09-04
Payer: COMMERCIAL

## 2024-09-04 ENCOUNTER — OFFICE VISIT (OUTPATIENT)
Dept: FAMILY MEDICINE CLINIC | Facility: CLINIC | Age: 29
End: 2024-09-04
Payer: COMMERCIAL

## 2024-09-04 VITALS
WEIGHT: 111 LBS | DIASTOLIC BLOOD PRESSURE: 60 MMHG | HEIGHT: 61 IN | OXYGEN SATURATION: 99 % | BODY MASS INDEX: 20.96 KG/M2 | HEART RATE: 77 BPM | TEMPERATURE: 97.3 F | SYSTOLIC BLOOD PRESSURE: 90 MMHG

## 2024-09-04 DIAGNOSIS — J32.1 FRONTAL SINUSITIS, UNSPECIFIED CHRONICITY: ICD-10-CM

## 2024-09-04 DIAGNOSIS — F41.1 GAD (GENERALIZED ANXIETY DISORDER): ICD-10-CM

## 2024-09-04 DIAGNOSIS — T63.441A BEE STING, ACCIDENTAL OR UNINTENTIONAL, INITIAL ENCOUNTER: ICD-10-CM

## 2024-09-04 DIAGNOSIS — Z20.822 CLOSE EXPOSURE TO COVID-19 VIRUS: Primary | ICD-10-CM

## 2024-09-04 LAB
EXPIRATION DATE: NORMAL
FLUAV AG UPPER RESP QL IA.RAPID: NOT DETECTED
FLUBV AG UPPER RESP QL IA.RAPID: NOT DETECTED
INTERNAL CONTROL: NORMAL
Lab: NORMAL
SARS-COV-2 AG UPPER RESP QL IA.RAPID: NOT DETECTED

## 2024-09-04 PROCEDURE — 99214 OFFICE O/P EST MOD 30 MIN: CPT | Performed by: STUDENT IN AN ORGANIZED HEALTH CARE EDUCATION/TRAINING PROGRAM

## 2024-09-04 PROCEDURE — 87428 SARSCOV & INF VIR A&B AG IA: CPT | Performed by: STUDENT IN AN ORGANIZED HEALTH CARE EDUCATION/TRAINING PROGRAM

## 2024-09-04 RX ORDER — FERROUS SULFATE 325(65) MG
TABLET, DELAYED RELEASE (ENTERIC COATED) ORAL
COMMUNITY
Start: 2024-05-23

## 2024-09-04 RX ORDER — TRETINOIN 0.25 MG/G
CREAM TOPICAL
COMMUNITY
Start: 2024-05-21

## 2024-09-04 NOTE — TELEPHONE ENCOUNTER
Attempted to call pt in regards, to her request for a transfer of care from Karrie Coffey to [facility] has been approved. I attempted to call pt twice, no answer and unable to leave a voicemail.

## 2024-09-04 NOTE — PROGRESS NOTES
"Chief Complaint  Sinus Problem, Insect Bite, and Exposure To Known Illness (Covid exposure)    Subjective      History of Present Illness    Nolvia Benton is a 29 y.o. female who presents to [facility]   Presents for acute visit today reports that she been having runny nose congestion for the past 7 days she reports low-grade fevers.  She did not have any testing.  Patient denies any nausea or vomiting she did have a COVID exposure a few weeks ago.  She also states she would like to transfer of care to me today I discussed with her she needs to ask her provider first.  She states she had an insect bite yesterday and she took some Benadryl which is now improved.      Objective   Vital Signs:   Vitals:    09/04/24 1349   BP: 90/60   Pulse: 77   Temp: 97.3 °F (36.3 °C)   SpO2: 99%   Weight: 50.3 kg (111 lb)   Height: 154.9 cm (61\")     Body mass index is 20.97 kg/m².    Wt Readings from Last 3 Encounters:   09/04/24 50.3 kg (111 lb)   05/06/24 46.3 kg (102 lb 1.6 oz)   02/12/24 47.4 kg (104 lb 9.6 oz)     BP Readings from Last 3 Encounters:   09/04/24 90/60   05/06/24 108/75   02/12/24 100/66       Health Maintenance   Topic Date Due    PAP SMEAR  Never done    ANNUAL PHYSICAL  06/05/2024    COVID-19 Vaccine (4 - 2023-24 season) 09/01/2024    INFLUENZA VACCINE  08/01/2024    TDAP/TD VACCINES (1 - Tdap) 06/11/2025 (Originally 6/24/2016)    HEPATITIS C SCREENING  Completed    Pneumococcal Vaccine 0-64  Aged Out       Physical Exam  HENT:      Head: Normocephalic.      Right Ear: Tympanic membrane normal.      Left Ear: Tympanic membrane normal.      Nose: Congestion and rhinorrhea present.      Right Sinus: Frontal sinus tenderness present.      Left Sinus: Frontal sinus tenderness present.      Mouth/Throat:      Mouth: Mucous membranes are moist.      Pharynx: Oropharynx is clear. Posterior oropharyngeal erythema present. No oropharyngeal exudate.   Eyes:      Extraocular Movements: " Extraocular movements intact.      Conjunctiva/sclera: Conjunctivae normal.      Pupils: Pupils are equal, round, and reactive to light.   Cardiovascular:      Rate and Rhythm: Normal rate and regular rhythm.      Heart sounds: No murmur heard.  Pulmonary:      Effort: Pulmonary effort is normal.   Abdominal:      General: Abdomen is flat.   Musculoskeletal:      Cervical back: Normal range of motion.   Lymphadenopathy:      Cervical: No cervical adenopathy.   Neurological:      Mental Status: She is alert.   Psychiatric:         Mood and Affect: Mood normal.          Result Review :     The following data was reviewed by: Ne Echevarria MD on 09/04/2024:      Procedures          Diagnoses and all orders for this visit:    1. Close exposure to COVID-19 virus (Primary)  -     POCT SARS-CoV-2 Antigen DORA + Flu    2. Frontal sinusitis, unspecified chronicity  -     amoxicillin-clavulanate (AUGMENTIN) 875-125 MG per tablet; Take 1 tablet by mouth 2 (Two) Times a Day for 7 days.  Dispense: 14 tablet; Refill: 0    3. DENISE (generalized anxiety disorder)  -     Ambulatory Referral to Psychiatry    4. Bee sting, accidental or unintentional, initial encounter    Patient requesting referral to psychiatry she is not wanting to start any medications at this time    BMI is within normal parameters. No other follow-up for BMI required.         FOLLOW UP  No follow-ups on file.  Patient was given instructions and counseling regarding her condition or for health maintenance advice. Please see specific information pulled into the AVS if appropriate.       Ne Echevarria MD  09/04/24  15:50 EDT    CURRENT & DISCONTINUED MEDICATIONS  Current Outpatient Medications   Medication Instructions    amoxicillin-clavulanate (AUGMENTIN) 875-125 MG per tablet 1 tablet, Oral, 2 Times Daily    ferrous sulfate 325 (65 FE) MG EC tablet TAKE 2 TABLETS BY MOUTH EVERY OTHER DAY ON AN EMPTY STOMACH WITH SOMETHING ACIDIC    melatonin 3 mg, Oral, Nightly  PRN    tretinoin (RETIN-A) 0.025 % cream APPLY PEA SIZED AMOUNT TO ENTIRE FACE EVERY 3RD NIGHT. INCREASE USE AS TOLERATED    vitamin D (ERGOCALCIFEROL) 50,000 Units, Oral, Weekly       Medications Discontinued During This Encounter   Medication Reason    nystatin-triamcinolone (MYCOLOG) 296552-9.1 UNIT/GM-% ointment

## 2024-09-13 ENCOUNTER — PATIENT ROUNDING (BHMG ONLY) (OUTPATIENT)
Dept: FAMILY MEDICINE CLINIC | Facility: CLINIC | Age: 29
End: 2024-09-13
Payer: COMMERCIAL

## 2024-09-13 NOTE — PROGRESS NOTES
September 13, 2024    Hello, may I speak with Nolvia Benton?    My name is Prosper Olivares       I am  with [facility]  [address]  [address]  [place] [address]  [phone].    Before we get started may I verify your date of birth? 1995    I am calling to officially welcome you to our practice and ask about your recent visit. Is this a good time to talk? yes    Tell me about your visit with us. What things went well?  it was a good visit I plan to keep seeing her for the time being; only had a little of a wait time a side from that it went well       We're always looking for ways to make our patients' experiences even better. Do you have recommendations on ways we may improve?  no    Overall were you satisfied with your first visit to our practice? yes       I appreciate you taking the time to speak with me today. Is there anything else I can do for you? no      Thank you, and have a great day.

## 2025-05-13 ENCOUNTER — OFFICE VISIT (OUTPATIENT)
Dept: FAMILY MEDICINE CLINIC | Facility: CLINIC | Age: 30
End: 2025-05-13
Payer: COMMERCIAL

## 2025-05-13 VITALS
DIASTOLIC BLOOD PRESSURE: 68 MMHG | WEIGHT: 111 LBS | TEMPERATURE: 98 F | OXYGEN SATURATION: 97 % | BODY MASS INDEX: 20.96 KG/M2 | SYSTOLIC BLOOD PRESSURE: 112 MMHG | HEIGHT: 61 IN | HEART RATE: 77 BPM

## 2025-05-13 DIAGNOSIS — Z00.00 ANNUAL PHYSICAL EXAM: Primary | ICD-10-CM

## 2025-05-13 DIAGNOSIS — R61 UNEXPLAINED NIGHT SWEATS: ICD-10-CM

## 2025-05-13 DIAGNOSIS — E55.9 VITAMIN D DEFICIENCY: ICD-10-CM

## 2025-05-13 DIAGNOSIS — R30.0 DYSURIA: ICD-10-CM

## 2025-05-13 DIAGNOSIS — D50.9 IRON DEFICIENCY ANEMIA, UNSPECIFIED IRON DEFICIENCY ANEMIA TYPE: ICD-10-CM

## 2025-05-13 DIAGNOSIS — E53.8 VITAMIN B12 DEFICIENCY: ICD-10-CM

## 2025-05-13 PROBLEM — H10.13 ALLERGIC CONJUNCTIVITIS OF BOTH EYES: Status: ACTIVE | Noted: 2025-05-13

## 2025-05-13 PROBLEM — J30.2 SEASONAL ALLERGIES: Status: ACTIVE | Noted: 2025-05-13

## 2025-05-13 LAB
BILIRUB BLD-MCNC: NEGATIVE MG/DL
CLARITY, POC: CLEAR
COLOR UR: YELLOW
EXPIRATION DATE: ABNORMAL
GLUCOSE UR STRIP-MCNC: NEGATIVE MG/DL
KETONES UR QL: NEGATIVE
LEUKOCYTE EST, POC: ABNORMAL
Lab: ABNORMAL
NITRITE UR-MCNC: NEGATIVE MG/ML
PH UR: 5.5 [PH] (ref 5–8)
PROT UR STRIP-MCNC: NEGATIVE MG/DL
RBC # UR STRIP: NEGATIVE /UL
SP GR UR: 1.01 (ref 1–1.03)
UROBILINOGEN UR QL: ABNORMAL

## 2025-05-13 RX ORDER — ERGOCALCIFEROL 1.25 MG/1
50000 CAPSULE, LIQUID FILLED ORAL WEEKLY
Qty: 12 CAPSULE | Refills: 0 | Status: SHIPPED | OUTPATIENT
Start: 2025-05-13

## 2025-05-13 NOTE — PROGRESS NOTES
"Chief Complaint  Annual Exam and Night Sweats    Subjective          Nolvia Benton presents to [facility]  History of Present Illness      History of Present Illness  The patient presents for evaluation of vitamin D deficiency, B12 deficiency, iron deficiency, and night sweats.    She has been diagnosed with vitamin D deficiency and is currently on a regimen of weekly vitamin D capsules, although she admits to inconsistent adherence. She also has a history of iron deficiency and wishes to have her iron levels checked.    She reports experiencing night sweats, which she believes are less frequent now but appear to be triggered by the cessation of her menstrual cycle. She expresses a desire to have her hormone levels evaluated to ensure they are within normal ranges. Her menstrual cycles are regular, occurring monthly.    Additionally, she mentions an increased sensitivity to cold temperatures.      Current Outpatient Medications   Medication Instructions    ferrous sulfate 325 (65 FE) MG EC tablet TAKE 2 TABLETS BY MOUTH EVERY OTHER DAY ON AN EMPTY STOMACH WITH SOMETHING ACIDIC    melatonin 3 mg, Oral, Nightly PRN    tretinoin (RETIN-A) 0.025 % cream APPLY PEA SIZED AMOUNT TO ENTIRE FACE EVERY 3RD NIGHT. INCREASE USE AS TOLERATED    vitamin D (ERGOCALCIFEROL) 50,000 Units, Oral, Weekly       The following portions of the patient's history were reviewed and updated as appropriate: allergies, current medications, past family history, past medical history, past social history, past surgical history, and problem list.    Objective   Vital Signs:   /68 (BP Location: Left arm, Patient Position: Sitting, Cuff Size: Adult)   Pulse 77   Temp 98 °F (36.7 °C)   Ht 154.9 cm (61\")   Wt 50.3 kg (111 lb)   SpO2 97%   BMI 20.97 kg/m²     BP Readings from Last 3 Encounters:   05/13/25 112/68   09/04/24 90/60   05/06/24 108/75     Wt Readings from Last 3 Encounters:   05/13/25 50.3 kg (111 " lb)   09/04/24 50.3 kg (111 lb)   05/06/24 46.3 kg (102 lb 1.6 oz)     BMI is within normal parameters. No other follow-up for BMI required.     Physical Exam  Constitutional:       Appearance: Normal appearance.   HENT:      Head: Normocephalic.      Nose: Nose normal.      Mouth/Throat:      Mouth: Mucous membranes are moist.   Eyes:      Pupils: Pupils are equal, round, and reactive to light.   Cardiovascular:      Rate and Rhythm: Normal rate and regular rhythm.   Pulmonary:      Effort: Pulmonary effort is normal.   Abdominal:      General: Abdomen is flat.   Musculoskeletal:         General: Normal range of motion.      Cervical back: Normal range of motion.   Skin:     General: Skin is warm and dry.   Neurological:      General: No focal deficit present.      Mental Status: She is alert.   Psychiatric:         Mood and Affect: Mood normal.         Thought Content: Thought content normal.              Result Review :   The following data was reviewed by: Ne Echevarria MD on 05/13/2025:           Lab Results   Component Value Date    SARSANTIGEN Not Detected 09/04/2024    RAPFLUA Negative 03/18/2023    RAPFLUB Negative 03/18/2023    FLUAAG Not Detected 09/04/2024    FLUBAG Not Detected 09/04/2024    RAPSCRN Negative 03/18/2023    BILIRUBINUR Negative 06/08/2023       Procedures        Assessment and Plan    Diagnoses and all orders for this visit:    1. Annual physical exam (Primary)  -     Comprehensive Metabolic Panel  -     CBC & Differential  -     TSH  -     Lipid Panel    2. Vitamin B12 deficiency  -     Vitamin B12 & Folate; Future    3. Vitamin D deficiency  -     vitamin D (ERGOCALCIFEROL) 1.25 MG (44266 UT) capsule capsule; Take 1 capsule by mouth 1 (One) Time Per Week.  Dispense: 12 capsule; Refill: 0  -     Vitamin D 25 hydroxy; Future    4. Iron deficiency anemia, unspecified iron deficiency anemia type  -     Ferritin; Future  -     Iron Profile; Future    5. Unexplained night sweats  -      Estrogens, Total  -     Follicle Stimulating Hormone  -     Luteinizing Hormone  -     Progesterone    6. Dysuria  -     POCT urinalysis dipstick, automated          Assessment & Plan  1. Vitamin D deficiency.  - She is not consistent with taking her vitamin D capsules once a week.  - A prescription for vitamin D capsules will be sent to the pharmacy.    2. Vitamin B12 deficiency.  - Her vitamin B12 levels will be checked as part of her yearly labs.  - A prescription for vitamin B12 supplements will be considered based on lab results.    3. Iron deficiency.  - Her iron levels will be checked as part of her yearly labs.  - A prescription for iron supplements will be considered based on lab results.    4. Night sweats.  - She reports night sweats that are less frequent now but tend to occur before her period stops.  - Hormonal levels will be checked through blood tests to determine if this is related.    5. Cold intolerance.  - She tends to feel cold more easily.  - Thyroid function will be checked as part of her yearly physical to determine if this is related.    6. Elevated cholesterol.  - She suspects she has slightly high bad cholesterol.  - Cholesterol levels will be checked as part of her yearly labs.    7. Health maintenance.  - A urine analysis will be conducted today.  - She is advised to return on a fasting day for her lab work, [facility]  - The results will be communicated via [employer].      Medications Discontinued During This Encounter   Medication Reason    vitamin D (ERGOCALCIFEROL) 1.25 MG (32285 UT) capsule capsule Reorder          Follow Up   No follow-ups on file.  Patient was given instructions and counseling regarding her condition or for health maintenance advice. Please see specific information pulled into the AVS if appropriate.       Ne Echevarria MD  05/13/25  15:47 EDT      Patient or patient representative verbalized  consent for the use of Ambient Listening during the visit with  Ne Echevarria MD for chart documentation. 5/13/2025  15:42 EDT

## 2025-05-20 ENCOUNTER — CLINICAL SUPPORT (OUTPATIENT)
Dept: FAMILY MEDICINE CLINIC | Facility: CLINIC | Age: 30
End: 2025-05-20
Payer: COMMERCIAL

## 2025-05-20 DIAGNOSIS — E53.8 VITAMIN B12 DEFICIENCY: ICD-10-CM

## 2025-05-20 DIAGNOSIS — E55.9 VITAMIN D DEFICIENCY: ICD-10-CM

## 2025-05-20 DIAGNOSIS — D50.9 IRON DEFICIENCY ANEMIA, UNSPECIFIED IRON DEFICIENCY ANEMIA TYPE: ICD-10-CM

## 2025-05-20 LAB
25(OH)D3 SERPL-MCNC: 23.6 NG/ML (ref 30–100)
ALBUMIN SERPL-MCNC: 4.5 G/DL (ref 3.5–5.2)
ALBUMIN/GLOB SERPL: 1.5 G/DL
ALP SERPL-CCNC: 30 U/L (ref 39–117)
ALT SERPL W P-5'-P-CCNC: 12 U/L (ref 1–33)
ANION GAP SERPL CALCULATED.3IONS-SCNC: 10.5 MMOL/L (ref 5–15)
AST SERPL-CCNC: 16 U/L (ref 1–32)
BASOPHILS # BLD AUTO: 0.05 10*3/MM3 (ref 0–0.2)
BASOPHILS NFR BLD AUTO: 0.8 % (ref 0–1.5)
BILIRUB SERPL-MCNC: 0.8 MG/DL (ref 0–1.2)
BUN SERPL-MCNC: 9 MG/DL (ref 6–20)
BUN/CREAT SERPL: 14.5 (ref 7–25)
CALCIUM SPEC-SCNC: 9.6 MG/DL (ref 8.6–10.5)
CHLORIDE SERPL-SCNC: 103 MMOL/L (ref 98–107)
CHOLEST SERPL-MCNC: 177 MG/DL (ref 0–200)
CO2 SERPL-SCNC: 19.5 MMOL/L (ref 22–29)
CREAT SERPL-MCNC: 0.62 MG/DL (ref 0.57–1)
DEPRECATED RDW RBC AUTO: 41.9 FL (ref 37–54)
EGFRCR SERPLBLD CKD-EPI 2021: 125.4 ML/MIN/1.73
EOSINOPHIL # BLD AUTO: 0.24 10*3/MM3 (ref 0–0.4)
EOSINOPHIL NFR BLD AUTO: 3.6 % (ref 0.3–6.2)
ERYTHROCYTE [DISTWIDTH] IN BLOOD BY AUTOMATED COUNT: 12.3 % (ref 12.3–15.4)
FERRITIN SERPL-MCNC: 88 NG/ML (ref 13–150)
FOLATE SERPL-MCNC: 3.1 NG/ML (ref 4.78–24.2)
FSH SERPL-ACNC: 3.27 MIU/ML
GLOBULIN UR ELPH-MCNC: 3.1 GM/DL
GLUCOSE SERPL-MCNC: 83 MG/DL (ref 65–99)
HCT VFR BLD AUTO: 41.4 % (ref 34–46.6)
HDLC SERPL-MCNC: 68 MG/DL (ref 40–60)
HGB BLD-MCNC: 13.1 G/DL (ref 12–15.9)
IMM GRANULOCYTES # BLD AUTO: 0.01 10*3/MM3 (ref 0–0.05)
IMM GRANULOCYTES NFR BLD AUTO: 0.2 % (ref 0–0.5)
IRON 24H UR-MRATE: 146 MCG/DL (ref 37–145)
IRON SATN MFR SERPL: 45 % (ref 20–50)
LDLC SERPL CALC-MCNC: 101 MG/DL (ref 0–100)
LDLC/HDLC SERPL: 1.49 {RATIO}
LH SERPL-ACNC: 7.36 MIU/ML
LYMPHOCYTES # BLD AUTO: 2.58 10*3/MM3 (ref 0.7–3.1)
LYMPHOCYTES NFR BLD AUTO: 38.7 % (ref 19.6–45.3)
MCH RBC QN AUTO: 29.2 PG (ref 26.6–33)
MCHC RBC AUTO-ENTMCNC: 31.6 G/DL (ref 31.5–35.7)
MCV RBC AUTO: 92.2 FL (ref 79–97)
MONOCYTES # BLD AUTO: 0.44 10*3/MM3 (ref 0.1–0.9)
MONOCYTES NFR BLD AUTO: 6.6 % (ref 5–12)
NEUTROPHILS NFR BLD AUTO: 3.34 10*3/MM3 (ref 1.7–7)
NEUTROPHILS NFR BLD AUTO: 50.1 % (ref 42.7–76)
NRBC BLD AUTO-RTO: 0 /100 WBC (ref 0–0.2)
PLATELET # BLD AUTO: 241 10*3/MM3 (ref 140–450)
PMV BLD AUTO: 11 FL (ref 6–12)
POTASSIUM SERPL-SCNC: 4.4 MMOL/L (ref 3.5–5.2)
PROGEST SERPL-MCNC: 18.9 NG/ML
PROT SERPL-MCNC: 7.6 G/DL (ref 6–8.5)
RBC # BLD AUTO: 4.49 10*6/MM3 (ref 3.77–5.28)
SODIUM SERPL-SCNC: 133 MMOL/L (ref 136–145)
TIBC SERPL-MCNC: 325 MCG/DL (ref 298–536)
TRANSFERRIN SERPL-MCNC: 218 MG/DL (ref 200–360)
TRIGL SERPL-MCNC: 38 MG/DL (ref 0–150)
TSH SERPL DL<=0.05 MIU/L-ACNC: 1.59 UIU/ML (ref 0.27–4.2)
VIT B12 BLD-MCNC: 406 PG/ML (ref 211–946)
VLDLC SERPL-MCNC: 8 MG/DL (ref 5–40)
WBC NRBC COR # BLD AUTO: 6.66 10*3/MM3 (ref 3.4–10.8)

## 2025-05-20 PROCEDURE — 83540 ASSAY OF IRON: CPT | Performed by: STUDENT IN AN ORGANIZED HEALTH CARE EDUCATION/TRAINING PROGRAM

## 2025-05-20 PROCEDURE — 85025 COMPLETE CBC W/AUTO DIFF WBC: CPT | Performed by: STUDENT IN AN ORGANIZED HEALTH CARE EDUCATION/TRAINING PROGRAM

## 2025-05-20 PROCEDURE — 80053 COMPREHEN METABOLIC PANEL: CPT | Performed by: STUDENT IN AN ORGANIZED HEALTH CARE EDUCATION/TRAINING PROGRAM

## 2025-05-20 PROCEDURE — 82607 VITAMIN B-12: CPT | Performed by: STUDENT IN AN ORGANIZED HEALTH CARE EDUCATION/TRAINING PROGRAM

## 2025-05-20 PROCEDURE — 84443 ASSAY THYROID STIM HORMONE: CPT | Performed by: STUDENT IN AN ORGANIZED HEALTH CARE EDUCATION/TRAINING PROGRAM

## 2025-05-20 PROCEDURE — 84144 ASSAY OF PROGESTERONE: CPT | Performed by: STUDENT IN AN ORGANIZED HEALTH CARE EDUCATION/TRAINING PROGRAM

## 2025-05-20 PROCEDURE — 36415 COLL VENOUS BLD VENIPUNCTURE: CPT | Performed by: NURSE PRACTITIONER

## 2025-05-20 PROCEDURE — 83002 ASSAY OF GONADOTROPIN (LH): CPT | Performed by: STUDENT IN AN ORGANIZED HEALTH CARE EDUCATION/TRAINING PROGRAM

## 2025-05-20 PROCEDURE — 82746 ASSAY OF FOLIC ACID SERUM: CPT | Performed by: STUDENT IN AN ORGANIZED HEALTH CARE EDUCATION/TRAINING PROGRAM

## 2025-05-20 PROCEDURE — 84466 ASSAY OF TRANSFERRIN: CPT | Performed by: STUDENT IN AN ORGANIZED HEALTH CARE EDUCATION/TRAINING PROGRAM

## 2025-05-20 PROCEDURE — 80061 LIPID PANEL: CPT | Performed by: STUDENT IN AN ORGANIZED HEALTH CARE EDUCATION/TRAINING PROGRAM

## 2025-05-20 PROCEDURE — 82306 VITAMIN D 25 HYDROXY: CPT | Performed by: STUDENT IN AN ORGANIZED HEALTH CARE EDUCATION/TRAINING PROGRAM

## 2025-05-20 PROCEDURE — 82672 ASSAY OF ESTROGEN: CPT | Performed by: STUDENT IN AN ORGANIZED HEALTH CARE EDUCATION/TRAINING PROGRAM

## 2025-05-20 PROCEDURE — 83001 ASSAY OF GONADOTROPIN (FSH): CPT | Performed by: STUDENT IN AN ORGANIZED HEALTH CARE EDUCATION/TRAINING PROGRAM

## 2025-05-20 PROCEDURE — 82728 ASSAY OF FERRITIN: CPT | Performed by: STUDENT IN AN ORGANIZED HEALTH CARE EDUCATION/TRAINING PROGRAM

## 2025-05-24 LAB — ESTROGEN SERPL-MCNC: 408 PG/ML
